# Patient Record
Sex: FEMALE | Race: OTHER
[De-identification: names, ages, dates, MRNs, and addresses within clinical notes are randomized per-mention and may not be internally consistent; named-entity substitution may affect disease eponyms.]

---

## 2020-01-22 ENCOUNTER — HOSPITAL ENCOUNTER (OUTPATIENT)
Dept: HOSPITAL 62 - OROUT | Age: 40
Discharge: HOME | End: 2020-01-22
Attending: OBSTETRICS & GYNECOLOGY
Payer: COMMERCIAL

## 2020-01-22 VITALS — DIASTOLIC BLOOD PRESSURE: 62 MMHG | SYSTOLIC BLOOD PRESSURE: 101 MMHG

## 2020-01-22 DIAGNOSIS — J45.909: ICD-10-CM

## 2020-01-22 DIAGNOSIS — O02.1: Primary | ICD-10-CM

## 2020-01-22 DIAGNOSIS — F17.210: ICD-10-CM

## 2020-01-22 DIAGNOSIS — Z32.01: ICD-10-CM

## 2020-01-22 LAB
APPEARANCE UR: (no result)
APTT PPP: YELLOW S
BILIRUB UR QL STRIP: NEGATIVE
GLUCOSE UR STRIP-MCNC: NEGATIVE MG/DL
KETONES UR STRIP-MCNC: NEGATIVE MG/DL
NITRITE UR QL STRIP: NEGATIVE
PH UR STRIP: 5 [PH] (ref 5–9)
PROT UR STRIP-MCNC: 30 MG/DL
SP GR UR STRIP: 1.02
UROBILINOGEN UR-MCNC: 2 MG/DL (ref ?–2)

## 2020-01-22 PROCEDURE — 59820 CARE OF MISCARRIAGE: CPT

## 2020-01-22 PROCEDURE — 81001 URINALYSIS AUTO W/SCOPE: CPT

## 2020-01-22 PROCEDURE — 86901 BLOOD TYPING SEROLOGIC RH(D): CPT

## 2020-01-22 PROCEDURE — 86900 BLOOD TYPING SEROLOGIC ABO: CPT

## 2020-01-22 PROCEDURE — 88305 TISSUE EXAM BY PATHOLOGIST: CPT

## 2020-01-22 PROCEDURE — 86850 RBC ANTIBODY SCREEN: CPT

## 2020-01-22 PROCEDURE — 01965 ANES INCOMPL/MISSED AB PX: CPT

## 2020-01-22 PROCEDURE — 36415 COLL VENOUS BLD VENIPUNCTURE: CPT

## 2020-01-22 NOTE — DISCHARGE SUMMARY
Discharge Summary (SDC)





- Discharge


Final Diagnosis: 





Missed 


Date of Surgery: 20


Discharge Date: 20


Condition: Stable


Forms:  [facility] Anesthesia D/C Instruction, Discharge POC-Surgical Service


Treatment or Instructions: 


Seek care immediately if: 


* You have heavy vaginal bleeding that soaks 1 pad in 1 hour for 2 hours in a 

  row.


* You have a fever higher than 100.4F (38C).


* You have abdominal cramps for more than 2 days.


* Your pain does not get better, even after treatment.


Contact your healthcare provider if: 


* You have foul-smelling vaginal discharge.


* You feel depressed or anxious.


* You feel very tired and weak.


* You have questions or concerns about your condition or care.


Self-care: 


* Use sanitary pads if needed. You may have light bleeding for up to 2 weeks. Do

  not use tampons. Use sanitary pads instead. This will help prevent a vaginal 

  infection.


* Rest as needed. Slowly start to do more each day. Return to your daily 

  activities as directed. 


* PELVIC REST: Do not insert anything into the vagina until cleared by MD.





Prescriptions: 


Metronidazole [Flagyl 500 mg Tablet] 500 mg PO Q12 5 Days #10 tablet


Hydrocodone/Acetaminophen [Norco 5-325 mg Tablet] 1 tab PO Q12 PRN 4 Days #8 

tablet


 PRN Reason: For Pain Scale 4-5


Ketorolac Tromethamine [Toradol 10 mg Tablet] 10 mg PO Q8HP PRN 10 Days #30 

tablet


 PRN Reason: Pain Scale Of 3


Referrals: 


MELITA MUNOZ MD [ACTIVE PROVISIONAL STAFF] - 20 9:30 am


VICENTE BELL MD [Primary Care Provider] - 


Discharge Diet: As Tolerated


Respiratory Treatments at Home: Deep Breathing/Coughing


Discharge Activity: Activity As Tolerated, Balance Activity w/Rest, No Lifting 

Over 10 Pounds, No Lifting/Push/Pulling, Pelvic Rest, No tub bath


Home Care Assistance: None Needed


Report the Following to Your Physician Immediately: Shortness of Breath, Nausea,

Vomiting, Increase in Pain, Fever over 101 Degrees, Unusual Bleeding, Redness, 

Swelling, Warmth, Increased Soreness, Drainage-Yellow, Drainage-Gray, Drainage-

Green, Drainage-Foul Smelling, Increased Vaginal Bleed, Large Clots, Numbness, 

Tingling Sensation, Visual Disturbance, Wheezing, Seizure, IV Site Infection 

Signs, Urinary Infection Signs

## 2020-01-22 NOTE — OPERATIVE REPORT
Operative Report


DATE OF SURGERY: 20


PREOPERATIVE DIAGNOSIS: Missed 


POSTOPERATIVE DIAGNOSIS: Same as above


OPERATION: Suction dilatiom and curettage


SURGEON: MELITA MUNOZ


ANESTHESIA: GA


TISSUE REMOVED OR ALTERED: Products of conception


COMPLICATIONS: 





None


ESTIMATED BLOOD LOSS: 50


INTRAOPERATIVE FINDINGS: Uterus sounded to 12 cm and palpated to 10 weeks. 

Products of conception noted in suction curette


PROCEDURE: 





IV fluids: Crystalloid IV fluids per anesthesia record





Disposition: To recovery room in stable condition





Description of the procedure: The patient was taken to the operating room where 

monitored anesthesia was administered and found to be adequate. She was then 

placed in the dorsol lithotomy position and prepped and draped in the usual 

sterile fashion.  A timeout was taken.  A weighted speculum was placed in the 

vagina and a Block retractor was used to bring the cervix into good view. A 

single-tooth tenaculum was used to grasp the anterior lip of the cervix and the 

cervix was serially dilated. The uterus sounded to approximately 12 cm and the 

cervix was serially dilated to approximately 10 mm.  The #10 suction curette was

inserted and using suction, the products of conception were removed. The suction

curette was removed and a regular curette was advanced to the uterine fundus.  

Gentle curettage was done in a circumferential manner until a gritty texture was

noted.  The curette was removed and suction curette re-inserted to the fundus. 

Suction curettage done once more. The tissue obtained will be sent to the lab as

products of conception. Part of sample sent fresh for Chromosomes. The procedure

was then terminated all instrument to remove the patient's vagina.





The patient tolerated the procedure well all instrument sponge and needle counts

were correct x2 for the procedure she will proceed to recovery room in stable 

condition